# Patient Record
Sex: MALE | Race: WHITE | URBAN - METROPOLITAN AREA
[De-identification: names, ages, dates, MRNs, and addresses within clinical notes are randomized per-mention and may not be internally consistent; named-entity substitution may affect disease eponyms.]

---

## 2023-07-16 ENCOUNTER — OFFICE VISIT (OUTPATIENT)
Facility: LOCATION | Age: 33
End: 2023-07-16
Payer: COMMERCIAL

## 2023-07-16 DIAGNOSIS — H16.223 KERATOCONJUNCTIVITIS SICCA, BILATERAL: ICD-10-CM

## 2023-07-16 DIAGNOSIS — H52.13 MYOPIA, BILATERAL: Primary | ICD-10-CM

## 2023-07-16 PROCEDURE — 92025 CPTRIZED CORNEAL TOPOGRAPHY: CPT | Performed by: OPHTHALMOLOGY

## 2023-07-17 NOTE — IMPRESSION/PLAN
Impression: Keratoconjunctivitis sicca, bilateral: E98.064. Plan: Recommend use of frequent high quality artificial tears, fish and/or flaxseed oil 2000 mg BID, and ointment at bedtime.

## 2023-07-17 NOTE — IMPRESSION/PLAN
Impression: Myopia, bilateral: H52.13. Plan: Recommend [place] ID OU. Risks and benefits disc with pt. Pt understands.

## 2023-07-27 ENCOUNTER — Encounter (OUTPATIENT)
Facility: LOCATION | Age: 33
End: 2023-07-27

## 2023-07-28 ENCOUNTER — POST-OPERATIVE VISIT (OUTPATIENT)
Facility: LOCATION | Age: 33
End: 2023-07-28
Payer: COMMERCIAL

## 2023-07-28 DIAGNOSIS — H52.13 MYOPIA, BILATERAL: ICD-10-CM

## 2023-07-28 DIAGNOSIS — Z48.810 ENCOUNTER FOR SURGICAL AFTERCARE FOLLOWING SURGERY ON A SENSE ORGAN: Primary | ICD-10-CM

## 2023-07-28 PROCEDURE — 99024 POSTOP FOLLOW-UP VISIT: CPT | Performed by: OPHTHALMOLOGY

## 2023-07-28 PROCEDURE — 92015 DETERMINE REFRACTIVE STATE: CPT | Performed by: OPHTHALMOLOGY

## 2023-08-04 ENCOUNTER — POST-OPERATIVE VISIT (OUTPATIENT)
Facility: LOCATION | Age: 33
End: 2023-08-04
Payer: COMMERCIAL

## 2023-08-04 DIAGNOSIS — Z48.810 ENCOUNTER FOR SURGICAL AFTERCARE FOLLOWING SURGERY ON A SENSE ORGAN: Primary | ICD-10-CM

## 2023-08-04 PROCEDURE — 99024 POSTOP FOLLOW-UP VISIT: CPT | Performed by: OPHTHALMOLOGY

## 2023-09-22 ENCOUNTER — POST-OPERATIVE VISIT (OUTPATIENT)
Facility: LOCATION | Age: 33
End: 2023-09-22
Payer: COMMERCIAL

## 2023-09-22 DIAGNOSIS — Z48.810 ENCOUNTER FOR SURGICAL AFTERCARE FOLLOWING SURGERY ON A SENSE ORGAN: Primary | ICD-10-CM

## 2023-09-22 PROCEDURE — 99024 POSTOP FOLLOW-UP VISIT: CPT | Performed by: OPHTHALMOLOGY

## 2023-09-22 ASSESSMENT — INTRAOCULAR PRESSURE
OS: 14
OD: 14

## 2024-01-26 ENCOUNTER — POST-OPERATIVE VISIT (OUTPATIENT)
Facility: LOCATION | Age: 34
End: 2024-01-26
Payer: COMMERCIAL

## 2024-01-26 DIAGNOSIS — Z48.810 ENCOUNTER FOR SURGICAL AFTERCARE FOLLOWING SURGERY ON A SENSE ORGAN: Primary | ICD-10-CM

## 2024-01-26 PROCEDURE — 99024 POSTOP FOLLOW-UP VISIT: CPT | Performed by: OPHTHALMOLOGY

## 2024-01-26 ASSESSMENT — INTRAOCULAR PRESSURE
OS: 14
OD: 14

## 2024-07-26 ENCOUNTER — OFFICE VISIT (OUTPATIENT)
Facility: LOCATION | Age: 34
End: 2024-07-26
Payer: COMMERCIAL

## 2024-07-26 DIAGNOSIS — Z48.810 ENCOUNTER FOR SURGICAL AFTERCARE FOLLOWING SURGERY ON A SENSE ORGAN: Primary | ICD-10-CM

## 2024-07-26 PROCEDURE — 99024 POSTOP FOLLOW-UP VISIT: CPT | Performed by: OPHTHALMOLOGY

## 2024-07-26 ASSESSMENT — VISUAL ACUITY
OS: 20/15
OD: 20/15

## 2024-07-26 ASSESSMENT — KERATOMETRY
OD: 39.49
OS: 38.88

## 2024-07-26 ASSESSMENT — INTRAOCULAR PRESSURE
OD: 14
OS: 14